# Patient Record
Sex: FEMALE | Race: WHITE
[De-identification: names, ages, dates, MRNs, and addresses within clinical notes are randomized per-mention and may not be internally consistent; named-entity substitution may affect disease eponyms.]

---

## 2021-02-08 ENCOUNTER — HOSPITAL ENCOUNTER (EMERGENCY)
Dept: HOSPITAL 33 - ED | Age: 26
Discharge: HOME | End: 2021-02-08
Payer: COMMERCIAL

## 2021-02-08 VITALS — OXYGEN SATURATION: 100 % | HEART RATE: 81 BPM | SYSTOLIC BLOOD PRESSURE: 113 MMHG | DIASTOLIC BLOOD PRESSURE: 66 MMHG

## 2021-02-08 DIAGNOSIS — O21.0: ICD-10-CM

## 2021-02-08 DIAGNOSIS — Z3A.15: ICD-10-CM

## 2021-02-08 DIAGNOSIS — R10.13: ICD-10-CM

## 2021-02-08 DIAGNOSIS — O26.892: Primary | ICD-10-CM

## 2021-02-08 DIAGNOSIS — R10.11: ICD-10-CM

## 2021-02-08 LAB
ALBUMIN SERPL-MCNC: 3.9 G/DL (ref 3.5–5)
ALP SERPL-CCNC: 41 U/L (ref 38–126)
ALT SERPL-CCNC: 12 U/L (ref 0–35)
ANION GAP SERPL CALC-SCNC: 11 MEQ/L (ref 5–15)
AST SERPL QL: 21 U/L (ref 14–36)
BASOPHILS # BLD AUTO: 0 10*3/UL (ref 0–0.4)
BASOPHILS NFR BLD AUTO: 0 % (ref 0–0.4)
BILIRUB BLD-MCNC: 0.2 MG/DL (ref 0.2–1.3)
BUN SERPL-MCNC: 4 MG/DL (ref 7–17)
CALCIUM SPEC-MCNC: 9.4 MG/DL (ref 8.4–10.2)
CHLORIDE SERPL-SCNC: 105 MMOL/L (ref 98–107)
CO2 SERPL-SCNC: 23 MMOL/L (ref 22–30)
CREAT SERPL-MCNC: 0.59 MG/DL (ref 0.52–1.04)
EOSINOPHIL # BLD AUTO: 0.11 10*3/UL (ref 0–0.5)
GFR SERPLBLD BASED ON 1.73 SQ M-ARVRAT: > 60 ML/MIN
GLUCOSE SERPL-MCNC: 97 MG/DL (ref 74–106)
GLUCOSE UR-MCNC: NEGATIVE MG/DL
HCT VFR BLD AUTO: 38.1 % (ref 35–47)
HGB BLD-MCNC: 12.4 GM/DL (ref 12–16)
LIPASE SERPL-CCNC: 68 U/L (ref 23–300)
LYMPHOCYTES # SPEC AUTO: 1.81 10*3/UL (ref 1–4.6)
MCH RBC QN AUTO: 31.2 PG (ref 26–32)
MCHC RBC AUTO-ENTMCNC: 32.5 G/DL (ref 32–36)
MONOCYTES # BLD AUTO: 0.99 10*3/UL (ref 0–1.3)
PLATELET # BLD AUTO: 275 K/MM3 (ref 150–450)
POTASSIUM SERPLBLD-SCNC: 3.8 MMOL/L (ref 3.5–5.1)
PROT SERPL-MCNC: 7 G/DL (ref 6.3–8.2)
PROT UR STRIP-MCNC: NEGATIVE MG/DL
RBC # BLD AUTO: 3.98 M/MM3 (ref 4.1–5.4)
RBC #/AREA URNS HPF: (no result) /HPF (ref 0–2)
SODIUM SERPL-SCNC: 135 MMOL/L (ref 137–145)
WBC # BLD AUTO: 9.5 K/MM3 (ref 4–10.5)
WBC #/AREA URNS HPF: (no result) /HPF (ref 0–5)

## 2021-02-08 PROCEDURE — 81001 URINALYSIS AUTO W/SCOPE: CPT

## 2021-02-08 PROCEDURE — 96374 THER/PROPH/DIAG INJ IV PUSH: CPT

## 2021-02-08 PROCEDURE — 80048 BASIC METABOLIC PNL TOTAL CA: CPT

## 2021-02-08 PROCEDURE — 36415 COLL VENOUS BLD VENIPUNCTURE: CPT

## 2021-02-08 PROCEDURE — 36000 PLACE NEEDLE IN VEIN: CPT

## 2021-02-08 PROCEDURE — 83690 ASSAY OF LIPASE: CPT

## 2021-02-08 PROCEDURE — 80076 HEPATIC FUNCTION PANEL: CPT

## 2021-02-08 PROCEDURE — 85025 COMPLETE CBC W/AUTO DIFF WBC: CPT

## 2021-02-08 PROCEDURE — 99284 EMERGENCY DEPT VISIT MOD MDM: CPT

## 2021-02-08 NOTE — ERPHSYRPT
<JENIFER TORREZ - Last Filed: 21 19:10>





- History of Present Illness


Source: patient


Exam Limitations: no limitations


Associated Symptoms: nausea, vomiting, abdominal pain


Hx Tetanus, Diphtheria Vaccination/Date Given: Yes ()


Hx Influenza Vaccination/Date Given: No


Hx Pneumococcal Vaccination/Date Given: No





<LUPILLO FUNK - Last Filed: 21 10:04>





- History of Present Illness


Time Seen by Provider: 21 18:03


Physician History: 





The patient is a 25-year-old  who is currently 15 weeks estimated 

gestational age who presents with a chief complaint of abdominal pain.


Onset reported this as Saturday.


The pain is described as a sharp pain located in her epigastrium and right upper

quadrant that waxes and wanes in severity and has been intermittent.


The pain is been constant and is associated with nausea in addition to vomiting.


She denies any fever or chills as well as diarrhea.


She denies vaginal bleeding or discharge and reportedly spoke to her OB today 

who instructed her to come to the emergency department for further evaluation.


The patient denies history of gallstones.


She has not taken any pain and does not want to take anything because she is 

pregnant. (LUPILLO FUNK)


Allergies/Adverse Reactions: 








levofloxacin [From Levaquin] Allergy (Verified 18 19:42)


   


melatonin Allergy (Verified 21 18:03)


   


Penicillins Allergy (Verified 18 19:42)


   


sulfamethoxazole [From Bactrim] Allergy (Verified 18 19:42)


   


trimethoprim [From Bactrim] Allergy (Verified 18 19:42)


   





Home Medications: 








Metformin HCl 500 mg*** [Glucophage 500 MG***] 1 ea BID 21 [History]


Prenatal Vits W-Ca,Fe,FA(<1Mg) [Prenatal] 1 ea DAILY 21 [History]








- Review of Systems


Constitutional: No Fever, No Chills


Abdominal/Gastrointestinal: Abdominal Pain, Nausea, Vomiting


Genitourinary Symptoms: Pregnancy, No Vaginal Bleeding, No Vaginal Discharge


Musculoskeletal: No Symptoms


Skin: No Symptoms


Neurological: No Symptoms





<LUPILLO FUNK - Last Filed: 21 10:04>





- Past Medical History


Pertinent Past Medical History: No


Neurological History: No Pertinent History


ENT History: No Pertinent History


Cardiac History: No Pertinent History


Respiratory History: No Pertinent History


Endocrine Medical History: No Pertinent History


Musculoskeletal History: No Pertinent History


GI Medical History: No Pertinent History


 History: No Pertinent History


Psycho-Social History: No Pertinent History


Female Reproductive Disorders: No Pertinent History





- Past Surgical History


Past Surgical History: Yes


Other Surgical History: sinus surgery in 





- Social History


Smoking Status: Never smoker


Exposure to second hand smoke: No


Drug Use: none


Patient Lives Alone: No





<LUPILLO FUNK - Last Filed: 21 10:04>





- Physical Exam


General Appearance: no apparent distress, alert


Eye Exam: PERRL/EOMI, No scleral icterus, No EOM palsy/anisocoria


Ears, Nose, Throat Exam: pharynx normal, moist mucous membranes, No pharyngeal 

erythema, No tonsillar exudate


Neck Exam: normal inspection, non-tender, supple


Respiratory Exam: normal breath sounds, lungs clear, No chest tenderness, No 

respiratory distress


Cardiovascular Exam: regular rate/rhythm, normal heart sounds, normal peripheral

 pulses, No murmur, No friction rub, No gallop, No tachycardia, No edema


Gastrointestinal/Abdomen Exam: soft, tenderness (RUQ tenderness and epigastric 

tenderness), other (Gravid abdomen), No guarding, No rebound, No hepatomegaly


Pelvic Exam: not done


Rectal Exam: No deferred


Back Exam: normal inspection


Extremity Exam: normal inspection


Neurologic Exam: alert, oriented x 3, cooperative


Skin Exam: normal color, warm, dry, rash, No petechiae, No jaundice


**SpO2 Interpretation**: normal


O2 Delivery: Room Air





<LUPILLO FUNK - Last Filed: 21 10:04>





- Nursing Vital Signs


Nursing Vital Signs: 


                               Initial Vital Signs











Temperature  97.1 F   21 17:53


 


Pulse Rate  103 H  21 17:53


 


Respiratory Rate  18   21 17:53


 


Blood Pressure  137/63   21 17:53


 


O2 Sat by Pulse Oximetry  100   21 17:53








                                   Pain Scale











Pain Intensity                 7

















- Course


Nursing assessment & vital signs reviewed: Yes





<LUPILLO FUNK - Last Filed: 21 10:04>


Ordered Tests: 


                               Active Orders 24 hr











 Category Date Time Status


 


 IV Insertion STAT Care  02/08/21 18:08 Completed


 


 BMP Stat Lab  21 18:25 Completed


 


 CBC W DIFF Stat Lab  21 18:25 Completed


 


 Hepatic Function Panel Stat Lab  21 18:25 Completed


 


 LIPASE Stat Lab  21 18:25 Completed


 


 UA W/RFX UR CULTURE Stat Lab  21 18:33 Completed








Medication Summary














Discontinued Medications














Generic Name Dose Route Start Last Admin





  Trade Name Terri  PRN Reason Stop Dose Admin


 


Ondansetron HCl  4 mg  21 18:09  21 18:36





  Zofran 4 Mg/2 Ml Vial**  IV  21 18:10  4 mg





  STAT ONE   Administration


 


Ondansetron HCl  Confirm  21 18:34 





  Zofran 4 Mg/2 Ml Vial**  Administered  21 18:35 





  Dose  





  4 mg  





  .ROUTE  





  .STK-MED ONE  











Lab/Rad Data: 


                           Laboratory Result Diagrams





                                 21 18:25 





                                 21 18:25 





                               Laboratory Results











  21 Range/Units





  18:33 18:25 18:25 


 


WBC    9.5  (4.0-10.5)  K/mm3


 


RBC    3.98 L  (4.1-5.4)  M/mm3


 


Hgb    12.4  (12.0-16.0)  gm/dl


 


Hct    38.1  (35-47)  %


 


MCV    95.7  ()  fl


 


MCH    31.2  (26-32)  pg


 


MCHC    32.5  (32-36)  g/dl


 


RDW    12.6  (11.5-14.0)  %


 


Plt Count    275  (150-450)  K/mm3


 


MPV    8.2  (7.5-11.0)  fl


 


Gran %    69.3 H  (36.0-66.0)  %


 


Eos # (Auto)    0.11  (0-0.5)  


 


Absolute Lymphs (auto)    1.81  (1.0-4.6)  


 


Absolute Monos (auto)    0.99  (0.0-1.3)  


 


Lymphocytes %    19.1 L  (24.0-44.0)  %


 


Monocytes %    10.4  (0.0-12.0)  %


 


Eosinophils %    1.2  (0.00-5.0)  %


 


Basophils %    0.0  (0.0-0.4)  %


 


Absolute Granulocytes    6.58  (1.4-6.9)  


 


Basophils #    0  (0-0.4)  


 


Sodium   135 L   (137-145)  mmol/L


 


Potassium   3.8   (3.5-5.1)  mmol/L


 


Chloride   105   ()  mmol/L


 


Carbon Dioxide   23   (22-30)  mmol/L


 


Anion Gap   11.0   (5-15)  MEQ/L


 


BUN   4 L   (7-17)  mg/dL


 


Creatinine   0.59   (0.52-1.04)  mg/dL


 


Estimated GFR   > 60.0   ML/MIN


 


Glucose   97   ()  mg/dL


 


Calcium   9.4   (8.4-10.2)  mg/dL


 


Total Bilirubin   0.20   (0.2-1.3)  mg/dL


 


Direct Bilirubin   0.1   (0.0-0.4)  mg/dL


 


AST   21   (14-36)  U/L


 


ALT   12   (0-35)  U/L


 


Alkaline Phosphatase   41   ()  U/L


 


Serum Total Protein   7.0   (6.3-8.2)  g/dL


 


Albumin   3.9   (3.5-5.0)  g/dL


 


Lipase   68   ()  U/L


 


Urine Color  YELLOW    (YELLOW)  


 


Urine Appearance  CLOUDY    (CLEAR)  


 


Urine pH  5.0    (5-6)  


 


Ur Specific Gravity  1.019    (1.005-1.025)  


 


Urine Protein  NEGATIVE    (Negative)  


 


Urine Ketones  NEGATIVE    (NEGATIVE)  


 


Urine Blood  NEGATIVE    (0-5)  Roque/ul


 


Urine Nitrite  NEGATIVE    (NEGATIVE)  


 


Urine Bilirubin  NEGATIVE    (NEGATIVE)  


 


Urine Urobilinogen  NEGATIVE    (0-1)  mg/dL


 


Ur Leukocyte Esterase  NEGATIVE    (NEGATIVE)  


 


Urine WBC (Auto)  0-2    (0-5)  /HPF


 


Urine RBC (Auto)  3-5    (0-2)  /HPF


 


U Epithel Cells (Auto)  FEW    (FEW)  /HPF


 


Urine Bacteria (Auto)  NONE    (NEGATIVE)  /HPF


 


Urine Mucus (Auto)  SLIGHT    (NEGATIVE)  /HPF


 


Urine Culture Reflexed  NO    (NO)  


 


Urine Glucose  NEGATIVE    (NEGATIVE)  mg/dL














- Progress


Progress: improved, re-examined


Discussed with : Bushra


Counseled pt/family regarding: lab results, diagnosis, need for follow-up





<JENIFER TORREZ - Last Filed: 21 19:10>





<LUPILLO FUNK - Last Filed: 21 10:04>





- Progress


Progress Note: 





21 19:11


I spoke with Dr. Mi, the patient's obstetrician.  I reviewed the patient's 

condition, physical findings, laboratory work-up results and response of the 

patient to treatment.  He feels the patient can be discharged to home.  She will

 follow up with his office to make arrangements for outpatient gallbladder 

ultrasound if he feels it is indicated at that time.  We will send the patient 

home with Pepcid prescription as well as a prescription for Zofran.  He agrees 

with this medication regimen. (JENIFER TORREZ)





21 18:39


Nontoxic appearance.  The patient presents with epigastric pain and right upper 

quadrant pain in the context of being 15 weeks estimated gestational age.  

Overall, she is well-appearing.  Differential at this time includes gastritis, 

peptic ulcer disease, cholelithiasis, cholecystitis, choledocholithiasis.  I 

have a low gestalt for PE at this time and the patient has no vaginal bleeding 

or discharge or symptoms consistent with a UTI and my suspicion for TOA or fetal

 demise at this time is low.  Labs to include CBC, BMP, LFTs, lipase and UA are 

currently pending.  I suspect this patient will likely warrant a right upper 

quadrant ultrasound for further evaluation.  Patient care will be transitioned 

to Dr. Torrez pending workup findings


21 18:54


Nursing reports the patients FHT are 160 bpm


21 19:06


Was reassessed to find that her pain had nearly resolved that her nausea was 

better.  Dr. Torrez is currently speaking with her OB/GYN right now to figure 

out how to navigate the patient in terms of her.  Her laboratory work-up is 

relatively benign and if need be her right upper quadrant ultrasound could be 

obtained as an outpatient. (LUPILLO FUNK)





- Departure


Departure Disposition: Home


Critical Care Time: No





<JENIFER TORREZ - Last Filed: 21 19:10>





<LUPILLO FUNK - Last Filed: 21 10:04>





- Departure


Clinical Impression: 


 Epigastric pain, Right upper quadrant abdominal pain





Condition: Stable


Referrals: 


JOSE CELESTE NP [Primary Care Provider] - 


Instructions:  Acute Abdomen (Belly Pain), Adult (DC)


Additional Instructions: 


Avoid fatty greasy spicy foods.  Drink plenty of fluids.  Follow-up with Dr. Mi's office tomorrow to make arrangements for a follow-up appointment.  Take 

the medication as prescribed.


Prescriptions: 


Ondansetron ODT 4 MG*** [Zofran Odt 4 mg***] 4 mg PO Q6H PRN PRN #10 tab.rapdis


 PRN Reason: Vomiting


Famotidine 20 mg*** [Pepcid 20 MG***] 20 mg PO DAILY #10 tablet

## 2021-02-15 ENCOUNTER — HOSPITAL ENCOUNTER (EMERGENCY)
Dept: HOSPITAL 33 - ED | Age: 26
Discharge: HOME | End: 2021-02-15
Payer: COMMERCIAL

## 2021-02-15 VITALS — OXYGEN SATURATION: 99 %

## 2021-02-15 VITALS — HEART RATE: 90 BPM | SYSTOLIC BLOOD PRESSURE: 114 MMHG | DIASTOLIC BLOOD PRESSURE: 90 MMHG

## 2021-02-15 DIAGNOSIS — R10.9: ICD-10-CM

## 2021-02-15 DIAGNOSIS — R42: ICD-10-CM

## 2021-02-15 DIAGNOSIS — Z79.899: ICD-10-CM

## 2021-02-15 DIAGNOSIS — O21.0: Primary | ICD-10-CM

## 2021-02-15 DIAGNOSIS — N39.0: ICD-10-CM

## 2021-02-15 DIAGNOSIS — Z3A.16: ICD-10-CM

## 2021-02-15 LAB
GLUCOSE UR-MCNC: NEGATIVE MG/DL
PROT UR STRIP-MCNC: 30 MG/DL
RBC #/AREA URNS HPF: (no result) /HPF (ref 0–2)
WBC #/AREA URNS HPF: (no result) /HPF (ref 0–5)

## 2021-02-15 PROCEDURE — 96374 THER/PROPH/DIAG INJ IV PUSH: CPT

## 2021-02-15 PROCEDURE — 96375 TX/PRO/DX INJ NEW DRUG ADDON: CPT

## 2021-02-15 PROCEDURE — 76805 OB US >/= 14 WKS SNGL FETUS: CPT

## 2021-02-15 PROCEDURE — 96365 THER/PROPH/DIAG IV INF INIT: CPT

## 2021-02-15 PROCEDURE — 96361 HYDRATE IV INFUSION ADD-ON: CPT

## 2021-02-15 PROCEDURE — 99285 EMERGENCY DEPT VISIT HI MDM: CPT

## 2021-02-15 PROCEDURE — 87086 URINE CULTURE/COLONY COUNT: CPT

## 2021-02-15 PROCEDURE — 36000 PLACE NEEDLE IN VEIN: CPT

## 2021-02-15 PROCEDURE — 81001 URINALYSIS AUTO W/SCOPE: CPT

## 2021-02-15 PROCEDURE — 96360 HYDRATION IV INFUSION INIT: CPT

## 2021-02-15 NOTE — ERPHSYRPT
- History of Present Illness


Historian: patient


Exam Limitations: no limitations


Patient Subjective Stated Complaint: vomiting


Triage Nursing Assessment: Patient ambulated back to ED and transferred self to 

bed. Patient A+O X3. Kelly'ts skin pink, warm and dry. Patient states she is 16

weeks pregnant and has been having N/V constanly. Patient states she was at work

today and got dizzy and felt like she was going to "Pass out".  Patient 

complains of lower abdominal cramping.  Abdomen soft and round with BS X 4.


Physician History: 





24 yo wf  16wks preg presents w N/V/cramping x10 days. Pt has been to ER for

same complaint on 21 for same complaint. She denies vag bleeding or DC. 

Hematuria/dysuria/fever/melena/hematochezia/diarrhea are all denied. Pt states 

that she has been a little lightheaded. 


Timing/Duration: other (10 days)


Modifying Factors: Improves With: nothing


Associated Symptoms: nausea, vomiting, No back, No chest pain, No diaphoresis, 

No diarrhea, No fever/chills, No fatigue, No headache, No heartburn, No loss of 

appetite, No neck pain, No rash, No shortness of breath, No syncope, No weakness


Previous symptoms: same symptoms as today


Allergies/Adverse Reactions: 








levofloxacin [From Levaquin] Allergy (Verified 02/15/21 14:34)


   


melatonin Allergy (Verified 02/15/21 14:34)


   


Penicillins Allergy (Verified 02/15/21 14:34)


   


sulfamethoxazole [From Bactrim] Allergy (Verified 02/15/21 14:34)


   


trimethoprim [From Bactrim] Allergy (Verified 02/15/21 14:34)


   





Home Medications: 








Metformin HCl 500 mg*** [Glucophage 500 MG***] 1 ea PO BID 21 [History]


Prenatal Vits W-Ca,Fe,FA(<1Mg) [Prenatal] 1 ea PO DAILY 21 [History]





Hx Tetanus, Diphtheria Vaccination/Date Given: Yes ()


Hx Influenza Vaccination/Date Given: Yes


Hx Pneumococcal Vaccination/Date Given: No


Immunizations Up to Date: Yes





Travel Risk





- International Travel


Have you traveled outside of the country in past 3 weeks: No





- Coronavirus Screening


Are you exhibiting any of the following symptoms?: No


Close contact with a COVID-19 positive Pt in past 14-21 Days: No





- Review of Systems


Constitutional: No Symptoms


Eyes: No Symptoms


Ears, Nose, & Throat: No Symptoms


Respiratory: No Symptoms


Cardiac: No Symptoms


Abdominal/Gastrointestinal: No Symptoms, Abdominal Pain, Nausea, Vomiting, No 

Diarrhea


Genitourinary Symptoms: No Symptoms


Musculoskeletal: No Symptoms


Skin: No Symptoms


Neurological: No Symptoms


Psychological: No Symptoms


Endocrine: No Symptoms


Hematologic/Lymphatic: No Symptoms


Immunological/Allergic: No Symptoms





- Past Medical History


Pertinent Past Medical History: No


Neurological History: No Pertinent History


ENT History: No Pertinent History


Cardiac History: No Pertinent History


Respiratory History: No Pertinent History


Endocrine Medical History: No Pertinent History


Musculoskeletal History: No Pertinent History


GI Medical History: No Pertinent History


 History: No Pertinent History


Psycho-Social History: No Pertinent History


Female Reproductive Disorders: No Pertinent History





- Past Surgical History


Past Surgical History: Yes


Musculoskeletal: Orthopedic Surgery


Other Surgical History: sinus surgery in 





- Social History


Smoking Status: Never smoker


Exposure to second hand smoke: No


Drug Use: none


Patient Lives Alone: No


Significant Family History: no pertinent family hx





- Female History


Hx Last Menstrual Period: 


Hx Pregnant Now: Yes


Expected Date of Delivery: 21





- Nursing Vital Signs


Nursing Vital Signs: 


                               Initial Vital Signs











Temperature  97.9 F   02/15/21 14:36


 


Pulse Rate  98 H  02/15/21 14:36


 


Respiratory Rate  18   02/15/21 14:36


 


Blood Pressure  130/67   02/15/21 14:36


 


O2 Sat by Pulse Oximetry  100   02/15/21 14:36








                                   Pain Scale











Pain Intensity                 0

















- Physical Exam


General Appearance: no apparent distress


Eye Exam: PERRL/EOMI


Ears, Nose, Throat Exam: normal ENT inspection, TMs normal, pharynx normal, 

moist mucous membranes


Neck Exam: normal inspection, non-tender, supple, full range of motion, No 

meningismus, No mass, No Brudzinski, No Kernig's


Respiratory Exam: normal breath sounds, lungs clear, airway intact


Cardiovascular Exam: regular rate/rhythm, normal heart sounds, normal peripheral

 pulses, No murmur


Gastrointestinal/Abdomen Exam: soft, normal bowel sounds, No tenderness


Pelvic Exam: not done


Back Exam: normal inspection


Extremity Exam: normal inspection


Neurologic Exam: alert, oriented x 3, cooperative, CNs II-XII nml as tested, 

normal mood/affect, nml cerebellar function, nml station & gait, sensation nml, 

No motor deficits, No sensory deficit


Skin Exam: normal color


Lymphatic Exam: No adenopathy


**SpO2 Interpretation**: normal


SpO2: 99


O2 Delivery: Room Air





- Course


Nursing assessment & vital signs reviewed: Yes





- Radiology Ultrasound Exam


  ** Abdomen


Ultrasound: discussed w/radiologist (I[facility]//Anterior placenta)


Ordered Tests: 


                               Active Orders 24 hr











 Category Date Time Status


 


 OB >14 WKS 1st GESTATION [US] Stat Exams  02/15/21 15:42 Completed


 


 CULTURE,URINE Stat Lab  02/15/21 14:43 Received


 


 UA W/RFX UR CULTURE Stat Lab  02/15/21 14:43 Completed








Medication Summary














Discontinued Medications














Generic Name Dose Route Start Last Admin





  Trade Name Freq  PRN Reason Stop Dose Admin


 


Diphenhydramine HCl  25 mg  02/15/21 15:44  02/15/21 15:48





  Benadryl 50 Mg/Ml***  IV  02/15/21 15:45  25 mg





  STAT ONE   Administration


 


Diphenhydramine HCl  Confirm  02/15/21 15:47 





  Benadryl 50 Mg/Ml***  Administered  02/15/21 15:48 





  Dose  





  50 mg  





  .ROUTE  





  .STK-MED ONE  


 


Sodium Chloride  1,000 mls @ 999 mls/hr  02/15/21 14:40  02/15/21 15:55





  Sodium Chloride 0.9% 1000 Ml  IV  02/15/21 15:40  Infused





  .Q1H1M STA   Infusion


 


Sodium Chloride  Confirm  02/15/21 14:44 





  Sodium Chloride 0.9% 1000 Ml  Administered  02/15/21 14:45 





  Dose  





  1,000 mls @ ud  





  .ROUTE  





  .STK-MED ONE  


 


Ceftriaxone Sodium/Dextrose  1 g in 50 mls @ 100 mls/hr  02/15/21 15:43  

02/15/21 16:45





  Rocephin 1 Gm-D5w 50 Ml Bag**  IV  02/15/21 16:12  Infused





  STAT STA   Infusion


 


Ceftriaxone Sodium/Dextrose  Confirm  02/15/21 15:47 





  Rocephin 1 Gm-D5w 50 Ml Bag**  Administered  02/15/21 15:48 





  Dose  





  1 g in 50 mls @ ud  





  IV  





  .STK-MED ONE  


 


Sodium Chloride  1,000 mls @ 999 mls/hr  02/15/21 15:49  02/15/21 16:56





  Sodium Chloride 0.9% 1000 Ml  IV  02/15/21 16:49  Infused





  .Q1H1M STA   Infusion


 


Sodium Chloride  Confirm  02/15/21 15:49 





  Sodium Chloride 0.9% 1000 Ml  Administered  02/15/21 15:50 





  Dose  





  1,000 mls @ ud  





  .ROUTE  





  .STK-MED ONE  


 


Ondansetron HCl  4 mg  02/15/21 14:40  02/15/21 14:45





  Zofran 4 Mg/2 Ml Vial**  IV  02/15/21 14:41  4 mg





  STAT ONE   Administration


 


Ondansetron HCl  Confirm  02/15/21 14:44 





  Zofran 4 Mg/2 Ml Vial**  Administered  02/15/21 14:45 





  Dose  





  4 mg  





  .ROUTE  





  .STK-MED ONE  











Lab/Rad Data: 


                               Laboratory Results











  02/15/21 Range/Units





  14:43 


 


Urine Color  YELLOW  (YELLOW)  


 


Urine Appearance  CLOUDY  (CLEAR)  


 


Urine pH  6.0  (5-6)  


 


Ur Specific Gravity  1.017  (1.005-1.025)  


 


Urine Protein  30  (Negative)  


 


Urine Ketones  TRACE  (NEGATIVE)  


 


Urine Blood  NEGATIVE  (0-5)  Roque/ul


 


Urine Nitrite  NEGATIVE  (NEGATIVE)  


 


Urine Bilirubin  NEGATIVE  (NEGATIVE)  


 


Urine Urobilinogen  NEGATIVE  (0-1)  mg/dL


 


Ur Leukocyte Esterase  NEGATIVE  (NEGATIVE)  


 


Urine WBC (Auto)  3-5  (0-5)  /HPF


 


Urine RBC (Auto)  NONE  (0-2)  /HPF


 


U Epithel Cells (Auto)  MANY  (FEW)  /HPF


 


Urine Bacteria (Auto)  MODERATE  (NEGATIVE)  /HPF


 


Urine Mucus (Auto)  SLIGHT  (NEGATIVE)  /HPF


 


Urine Culture Reflexed  YES  (NO)  


 


Urine Glucose  NEGATIVE  (NEGATIVE)  mg/dL














- Progress


Progress: improved


Progress Note: 





02/15/21 16:03


Dr. Mi consulted on pt in ER and wants hydration and 1gm IV Rocephin for mild

 UTI.


02/15/21 16:14


1L NS bolus x2/4mg IV Zofran w improvement


US results reviewed w pt


Discussed with : Other (Dr. Mi)


Counseled pt/family regarding: lab results, diagnosis, need for follow-up, rad 

results





- Departure


Departure Disposition: Home


Clinical Impression: 


 Hyperemesis gravidarum, UTI (urinary tract infection) during pregnancy





Condition: Stable


Critical Care Time: No


Referrals: 


JOSE CELESTE NP [Primary Care Provider] - 


Instructions:  Nausea and Vomiting of Pregnancy (DC), Urinary Tract Infections 

in Pregnancy


Additional Instructions: 


Follow up with Dr. Mi


Return to ER as needed


Continue Maggiefran per Dr. Mi

## 2021-06-09 ENCOUNTER — HOSPITAL ENCOUNTER (OUTPATIENT)
Dept: HOSPITAL 33 - WHC | Age: 26
Setting detail: OBSERVATION
Discharge: HOME | End: 2021-06-09
Attending: OBSTETRICS & GYNECOLOGY | Admitting: OBSTETRICS & GYNECOLOGY
Payer: COMMERCIAL

## 2021-06-09 VITALS — HEART RATE: 96 BPM | DIASTOLIC BLOOD PRESSURE: 73 MMHG | SYSTOLIC BLOOD PRESSURE: 122 MMHG | OXYGEN SATURATION: 98 %

## 2021-06-09 DIAGNOSIS — O36.5930: Primary | ICD-10-CM

## 2021-06-09 DIAGNOSIS — Z3A.32: ICD-10-CM

## 2021-06-09 PROCEDURE — 59025 FETAL NON-STRESS TEST: CPT

## 2021-06-09 PROCEDURE — G0378 HOSPITAL OBSERVATION PER HR: HCPCS

## 2021-06-13 ENCOUNTER — HOSPITAL ENCOUNTER (OUTPATIENT)
Dept: HOSPITAL 33 - OB | Age: 26
Setting detail: OBSERVATION
Discharge: HOME | End: 2021-06-13
Attending: OBSTETRICS & GYNECOLOGY | Admitting: OBSTETRICS & GYNECOLOGY
Payer: COMMERCIAL

## 2021-06-13 VITALS — HEART RATE: 95 BPM | SYSTOLIC BLOOD PRESSURE: 112 MMHG | DIASTOLIC BLOOD PRESSURE: 65 MMHG

## 2021-06-13 DIAGNOSIS — Z34.03: Primary | ICD-10-CM

## 2021-06-13 DIAGNOSIS — Z3A.32: ICD-10-CM

## 2021-06-13 PROCEDURE — G0378 HOSPITAL OBSERVATION PER HR: HCPCS

## 2021-06-13 PROCEDURE — 59025 FETAL NON-STRESS TEST: CPT

## 2021-06-16 ENCOUNTER — HOSPITAL ENCOUNTER (OUTPATIENT)
Dept: HOSPITAL 33 - MED SURG | Age: 26
Setting detail: OBSERVATION
Discharge: HOME | End: 2021-06-16
Attending: OBSTETRICS & GYNECOLOGY | Admitting: OBSTETRICS & GYNECOLOGY
Payer: COMMERCIAL

## 2021-06-16 DIAGNOSIS — Z3A.33: ICD-10-CM

## 2021-06-16 DIAGNOSIS — Z34.03: Primary | ICD-10-CM

## 2021-06-16 PROCEDURE — 59025 FETAL NON-STRESS TEST: CPT

## 2021-06-16 PROCEDURE — G0378 HOSPITAL OBSERVATION PER HR: HCPCS

## 2021-06-20 ENCOUNTER — HOSPITAL ENCOUNTER (OUTPATIENT)
Dept: HOSPITAL 33 - MED SURG | Age: 26
Setting detail: OBSERVATION
Discharge: HOME | End: 2021-06-20
Attending: FAMILY MEDICINE | Admitting: FAMILY MEDICINE
Payer: COMMERCIAL

## 2021-06-20 VITALS — SYSTOLIC BLOOD PRESSURE: 114 MMHG | DIASTOLIC BLOOD PRESSURE: 63 MMHG | OXYGEN SATURATION: 100 % | HEART RATE: 106 BPM

## 2021-06-20 DIAGNOSIS — Z3A.34: ICD-10-CM

## 2021-06-20 DIAGNOSIS — Z34.03: Primary | ICD-10-CM

## 2021-06-20 PROCEDURE — G0378 HOSPITAL OBSERVATION PER HR: HCPCS

## 2021-06-20 PROCEDURE — 59025 FETAL NON-STRESS TEST: CPT

## 2021-06-23 ENCOUNTER — HOSPITAL ENCOUNTER (OUTPATIENT)
Dept: HOSPITAL 33 - MED SURG | Age: 26
Setting detail: OBSERVATION
Discharge: HOME | End: 2021-06-23
Attending: OBSTETRICS & GYNECOLOGY | Admitting: OBSTETRICS & GYNECOLOGY
Payer: COMMERCIAL

## 2021-06-23 VITALS — HEART RATE: 101 BPM | DIASTOLIC BLOOD PRESSURE: 71 MMHG | SYSTOLIC BLOOD PRESSURE: 124 MMHG

## 2021-06-23 DIAGNOSIS — Z34.03: Primary | ICD-10-CM

## 2021-06-23 DIAGNOSIS — Z3A.34: ICD-10-CM

## 2021-06-23 PROCEDURE — 59025 FETAL NON-STRESS TEST: CPT

## 2021-06-23 PROCEDURE — G0378 HOSPITAL OBSERVATION PER HR: HCPCS

## 2021-06-27 ENCOUNTER — HOSPITAL ENCOUNTER (OUTPATIENT)
Dept: HOSPITAL 33 - OB | Age: 26
Setting detail: OBSERVATION
Discharge: HOME | End: 2021-06-27
Attending: OBSTETRICS & GYNECOLOGY | Admitting: OBSTETRICS & GYNECOLOGY
Payer: COMMERCIAL

## 2021-06-27 DIAGNOSIS — Z34.03: Primary | ICD-10-CM

## 2021-06-27 DIAGNOSIS — Z3A.34: ICD-10-CM

## 2021-06-27 PROCEDURE — G0378 HOSPITAL OBSERVATION PER HR: HCPCS

## 2021-06-27 PROCEDURE — 59025 FETAL NON-STRESS TEST: CPT

## 2021-06-30 ENCOUNTER — HOSPITAL ENCOUNTER (OUTPATIENT)
Dept: HOSPITAL 33 - MED SURG | Age: 26
Setting detail: OBSERVATION
Discharge: HOME | End: 2021-06-30
Attending: OBSTETRICS & GYNECOLOGY | Admitting: OBSTETRICS & GYNECOLOGY
Payer: COMMERCIAL

## 2021-06-30 VITALS — SYSTOLIC BLOOD PRESSURE: 109 MMHG | DIASTOLIC BLOOD PRESSURE: 67 MMHG | OXYGEN SATURATION: 99 % | HEART RATE: 83 BPM

## 2021-06-30 DIAGNOSIS — Z34.03: Primary | ICD-10-CM

## 2021-06-30 DIAGNOSIS — Z3A.34: ICD-10-CM

## 2021-06-30 LAB
AMPHETAMINES UR QL: NEGATIVE
BARBITURATES UR QL: NEGATIVE
BENZODIAZ UR QL SCN: NEGATIVE
COCAINE UR QL SCN: NEGATIVE
METHADONE UR QL: NEGATIVE
OPIATES UR QL: NEGATIVE
PCP UR QL CFM>20 NG/ML: NEGATIVE
THC UR QL SCN: NEGATIVE

## 2021-06-30 PROCEDURE — G0378 HOSPITAL OBSERVATION PER HR: HCPCS

## 2021-06-30 PROCEDURE — 80307 DRUG TEST PRSMV CHEM ANLYZR: CPT

## 2021-07-04 ENCOUNTER — HOSPITAL ENCOUNTER (OUTPATIENT)
Dept: HOSPITAL 33 - OB | Age: 26
Setting detail: OBSERVATION
Discharge: HOME | End: 2021-07-04
Attending: OBSTETRICS & GYNECOLOGY | Admitting: OBSTETRICS & GYNECOLOGY
Payer: COMMERCIAL

## 2021-07-04 VITALS — SYSTOLIC BLOOD PRESSURE: 108 MMHG | DIASTOLIC BLOOD PRESSURE: 61 MMHG | HEART RATE: 75 BPM

## 2021-07-04 DIAGNOSIS — Z34.03: Primary | ICD-10-CM

## 2021-07-04 DIAGNOSIS — Z3A.35: ICD-10-CM

## 2021-07-04 PROCEDURE — 59025 FETAL NON-STRESS TEST: CPT

## 2021-07-04 PROCEDURE — G0378 HOSPITAL OBSERVATION PER HR: HCPCS

## 2021-07-07 ENCOUNTER — HOSPITAL ENCOUNTER (OUTPATIENT)
Dept: HOSPITAL 33 - OB | Age: 26
Setting detail: OBSERVATION
Discharge: HOME | End: 2021-07-07
Attending: OBSTETRICS & GYNECOLOGY | Admitting: OBSTETRICS & GYNECOLOGY
Payer: COMMERCIAL

## 2021-07-07 VITALS — HEART RATE: 85 BPM | SYSTOLIC BLOOD PRESSURE: 114 MMHG | DIASTOLIC BLOOD PRESSURE: 61 MMHG

## 2021-07-07 DIAGNOSIS — Z34.03: Primary | ICD-10-CM

## 2021-07-07 DIAGNOSIS — Z3A.36: ICD-10-CM

## 2021-07-07 PROCEDURE — G0378 HOSPITAL OBSERVATION PER HR: HCPCS

## 2021-07-07 PROCEDURE — 76816 OB US FOLLOW-UP PER FETUS: CPT

## 2021-07-07 PROCEDURE — 59025 FETAL NON-STRESS TEST: CPT

## 2021-07-07 NOTE — XRAY
Indication: Fetal growth.



Two-dimensional OB ultrasound performed.



Comparison: June 9, 2021.



Again there is a single viable intrauterine pregnancy in cephalic

presentation. Fetal heart rate 137 BPM.  Anterior placenta without

abruption/previa.



BPD measures 8.71 cm corresponding to 35 weeks 1 days.

HC measures 31.43 cm corresponding to 35 weeks 2 days.

AC measures 32.29 cm corresponding to 36 weeks 1 days.

FL measures 6.92 cm corresponding to 35 weeks 4 days.

Estimated fetal weight 6 lbs. 2 oz., +/-15 ounce. Approximately 39 percentile.

BERTRAM is 8.5 cm.



Impression: Again single viable intrauterine pregnancy with mean gestational

age 35 week 4 days.  Normal progression of pregnancy.  New/acute findings.

## 2021-07-11 ENCOUNTER — HOSPITAL ENCOUNTER (OUTPATIENT)
Dept: HOSPITAL 33 - OB | Age: 26
Setting detail: OBSERVATION
Discharge: HOME | End: 2021-07-11
Attending: OBSTETRICS & GYNECOLOGY | Admitting: OBSTETRICS & GYNECOLOGY
Payer: COMMERCIAL

## 2021-07-11 VITALS — OXYGEN SATURATION: 99 % | HEART RATE: 84 BPM | DIASTOLIC BLOOD PRESSURE: 62 MMHG | SYSTOLIC BLOOD PRESSURE: 109 MMHG

## 2021-07-11 DIAGNOSIS — Z3A.37: ICD-10-CM

## 2021-07-11 DIAGNOSIS — Z34.03: Primary | ICD-10-CM

## 2021-07-11 PROCEDURE — G0378 HOSPITAL OBSERVATION PER HR: HCPCS

## 2021-07-11 PROCEDURE — 59025 FETAL NON-STRESS TEST: CPT

## 2021-07-14 ENCOUNTER — HOSPITAL ENCOUNTER (OUTPATIENT)
Dept: HOSPITAL 33 - OB | Age: 26
Setting detail: OBSERVATION
Discharge: HOME | End: 2021-07-14
Attending: OBSTETRICS & GYNECOLOGY | Admitting: OBSTETRICS & GYNECOLOGY
Payer: COMMERCIAL

## 2021-07-14 VITALS — SYSTOLIC BLOOD PRESSURE: 129 MMHG | HEART RATE: 96 BPM | DIASTOLIC BLOOD PRESSURE: 73 MMHG

## 2021-07-14 DIAGNOSIS — Z34.03: Primary | ICD-10-CM

## 2021-07-14 DIAGNOSIS — Z3A.37: ICD-10-CM

## 2021-07-14 PROCEDURE — G0378 HOSPITAL OBSERVATION PER HR: HCPCS

## 2021-07-14 PROCEDURE — 59025 FETAL NON-STRESS TEST: CPT

## 2021-07-18 ENCOUNTER — HOSPITAL ENCOUNTER (OUTPATIENT)
Dept: HOSPITAL 33 - OB | Age: 26
Setting detail: OBSERVATION
Discharge: HOME | End: 2021-07-18
Attending: OBSTETRICS & GYNECOLOGY | Admitting: OBSTETRICS & GYNECOLOGY
Payer: COMMERCIAL

## 2021-07-18 VITALS — DIASTOLIC BLOOD PRESSURE: 72 MMHG | SYSTOLIC BLOOD PRESSURE: 122 MMHG | HEART RATE: 106 BPM

## 2021-07-18 DIAGNOSIS — Z3A.37: ICD-10-CM

## 2021-07-18 DIAGNOSIS — Z34.03: Primary | ICD-10-CM

## 2021-07-18 PROCEDURE — 59025 FETAL NON-STRESS TEST: CPT

## 2021-07-18 PROCEDURE — G0378 HOSPITAL OBSERVATION PER HR: HCPCS

## 2021-07-21 ENCOUNTER — HOSPITAL ENCOUNTER (OUTPATIENT)
Dept: HOSPITAL 33 - MED SURG | Age: 26
Setting detail: OBSERVATION
Discharge: HOME | End: 2021-07-21
Attending: OBSTETRICS & GYNECOLOGY | Admitting: OBSTETRICS & GYNECOLOGY
Payer: COMMERCIAL

## 2021-07-21 VITALS — HEART RATE: 117 BPM | DIASTOLIC BLOOD PRESSURE: 73 MMHG | SYSTOLIC BLOOD PRESSURE: 116 MMHG

## 2021-07-21 VITALS — DIASTOLIC BLOOD PRESSURE: 75 MMHG | SYSTOLIC BLOOD PRESSURE: 122 MMHG | HEART RATE: 100 BPM

## 2021-07-21 DIAGNOSIS — O36.8130: Primary | ICD-10-CM

## 2021-07-21 DIAGNOSIS — Z3A.38: ICD-10-CM

## 2021-07-21 PROCEDURE — G0378 HOSPITAL OBSERVATION PER HR: HCPCS

## 2021-07-21 PROCEDURE — 59025 FETAL NON-STRESS TEST: CPT

## 2021-07-22 ENCOUNTER — HOSPITAL ENCOUNTER (INPATIENT)
Dept: HOSPITAL 33 - OB | Age: 26
LOS: 3 days | Discharge: HOME | End: 2021-07-25
Attending: OBSTETRICS & GYNECOLOGY | Admitting: OBSTETRICS & GYNECOLOGY
Payer: COMMERCIAL

## 2021-07-22 ENCOUNTER — HOSPITAL ENCOUNTER (OUTPATIENT)
Dept: HOSPITAL 33 - MED SURG | Age: 26
Setting detail: OBSERVATION
Discharge: HOME | End: 2021-07-22
Attending: OBSTETRICS & GYNECOLOGY | Admitting: OBSTETRICS & GYNECOLOGY
Payer: COMMERCIAL

## 2021-07-22 VITALS — SYSTOLIC BLOOD PRESSURE: 119 MMHG | DIASTOLIC BLOOD PRESSURE: 69 MMHG | HEART RATE: 95 BPM

## 2021-07-22 DIAGNOSIS — Z3A.38: ICD-10-CM

## 2021-07-22 DIAGNOSIS — Z34.03: Primary | ICD-10-CM

## 2021-07-22 DIAGNOSIS — N39.0: ICD-10-CM

## 2021-07-22 LAB
ABO GROUP BLD: (no result)
AMPHETAMINES UR QL: NEGATIVE
BARBITURATES UR QL: NEGATIVE
BASOPHILS # BLD AUTO: 0.02 10*3/UL (ref 0–0.4)
BASOPHILS NFR BLD AUTO: 0.2 % (ref 0–0.4)
BENZODIAZ UR QL SCN: NEGATIVE
COCAINE UR QL SCN: NEGATIVE
EOSINOPHIL # BLD AUTO: 0.08 10*3/UL (ref 0–0.5)
HCT VFR BLD AUTO: 31.1 % (ref 35–47)
HGB BLD-MCNC: 10.2 GM/DL (ref 12–16)
LYMPHOCYTES # SPEC AUTO: 1.65 10*3/UL (ref 1–4.6)
MCH RBC QN AUTO: 31.9 PG (ref 26–32)
MCHC RBC AUTO-ENTMCNC: 32.8 G/DL (ref 32–36)
METHADONE UR QL: NEGATIVE
MONOCYTES # BLD AUTO: 1.13 10*3/UL (ref 0–1.3)
OPIATES UR QL: NEGATIVE
PCP UR QL CFM>20 NG/ML: NEGATIVE
PLATELET # BLD AUTO: 270 K/MM3 (ref 150–450)
RBC # BLD AUTO: 3.2 M/MM3 (ref 4.1–5.4)
RH BLD: POSITIVE
THC UR QL SCN: NEGATIVE
WBC # BLD AUTO: 8.4 K/MM3 (ref 4–10.5)

## 2021-07-22 PROCEDURE — 59400 OBSTETRICAL CARE: CPT

## 2021-07-22 PROCEDURE — 87186 SC STD MICRODIL/AGAR DIL: CPT

## 2021-07-22 PROCEDURE — 36415 COLL VENOUS BLD VENIPUNCTURE: CPT

## 2021-07-22 PROCEDURE — 90471 IMMUNIZATION ADMIN: CPT

## 2021-07-22 PROCEDURE — 87389 HIV-1 AG W/HIV-1&-2 AB AG IA: CPT

## 2021-07-22 PROCEDURE — 85025 COMPLETE CBC W/AUTO DIFF WBC: CPT

## 2021-07-22 PROCEDURE — 81001 URINALYSIS AUTO W/SCOPE: CPT

## 2021-07-22 PROCEDURE — 90715 TDAP VACCINE 7 YRS/> IM: CPT

## 2021-07-22 PROCEDURE — 87086 URINE CULTURE/COLONY COUNT: CPT

## 2021-07-22 PROCEDURE — 87340 HEPATITIS B SURFACE AG IA: CPT

## 2021-07-22 PROCEDURE — 86900 BLOOD TYPING SEROLOGIC ABO: CPT

## 2021-07-22 PROCEDURE — 80307 DRUG TEST PRSMV CHEM ANLYZR: CPT

## 2021-07-22 PROCEDURE — 86901 BLOOD TYPING SEROLOGIC RH(D): CPT

## 2021-07-22 PROCEDURE — 87077 CULTURE AEROBIC IDENTIFY: CPT

## 2021-07-22 PROCEDURE — G0378 HOSPITAL OBSERVATION PER HR: HCPCS

## 2021-07-22 PROCEDURE — 86850 RBC ANTIBODY SCREEN: CPT

## 2021-07-22 PROCEDURE — 59025 FETAL NON-STRESS TEST: CPT

## 2021-07-22 RX ADMIN — FAMOTIDINE SCH MG: 20 TABLET, FILM COATED ORAL at 22:13

## 2021-07-22 RX ADMIN — MISOPROSTOL SCH MCG: 100 TABLET ORAL at 22:58

## 2021-07-22 RX ADMIN — MISOPROSTOL SCH MCG: 100 TABLET ORAL at 20:59

## 2021-07-23 RX ADMIN — MISOPROSTOL SCH MCG: 100 TABLET ORAL at 00:59

## 2021-07-23 RX ADMIN — MISOPROSTOL SCH MCG: 100 TABLET ORAL at 04:00

## 2021-07-23 RX ADMIN — DOCUSATE SODIUM SCH MG: 100 CAPSULE, LIQUID FILLED ORAL at 21:46

## 2021-07-23 RX ADMIN — FAMOTIDINE SCH: 20 TABLET, FILM COATED ORAL at 13:04

## 2021-07-23 RX ADMIN — IBUPROFEN PRN MG: 400 TABLET ORAL at 15:29

## 2021-07-23 RX ADMIN — MISOPROSTOL SCH MCG: 100 TABLET ORAL at 05:59

## 2021-07-23 RX ADMIN — ACETAMINOPHEN PRN MG: 500 TABLET ORAL at 01:05

## 2021-07-23 RX ADMIN — ACETAMINOPHEN PRN MG: 500 TABLET ORAL at 14:19

## 2021-07-23 RX ADMIN — ACETAMINOPHEN PRN MG: 500 TABLET ORAL at 21:48

## 2021-07-23 RX ADMIN — Medication PRN PAD: at 15:30

## 2021-07-23 RX ADMIN — FAMOTIDINE SCH MG: 20 TABLET, FILM COATED ORAL at 21:46

## 2021-07-24 LAB
BASOPHILS # BLD AUTO: 0.02 10*3/UL (ref 0–0.4)
BASOPHILS NFR BLD AUTO: 0.1 % (ref 0–0.4)
BLD SMEAR INTERP: YES
EOSINOPHIL # BLD AUTO: 0.14 10*3/UL (ref 0–0.5)
GLUCOSE UR-MCNC: NEGATIVE MG/DL
HCT VFR BLD AUTO: 28.1 % (ref 35–47)
HGB BLD-MCNC: 9.1 GM/DL (ref 12–16)
LYMPHOCYTES # SPEC AUTO: 2.41 10*3/UL (ref 1–4.6)
MCH RBC QN AUTO: 31.7 PG (ref 26–32)
MCHC RBC AUTO-ENTMCNC: 32.4 G/DL (ref 32–36)
MONOCYTES # BLD AUTO: 1.53 10*3/UL (ref 0–1.3)
PLATELET # BLD AUTO: 250 K/MM3 (ref 150–450)
PROT UR STRIP-MCNC: 30 MG/DL
RBC # BLD AUTO: 2.87 M/MM3 (ref 4.1–5.4)
RBC #/AREA URNS HPF: >101 /HPF (ref 0–2)
WBC # BLD AUTO: 14.1 K/MM3 (ref 4–10.5)
WBC #/AREA URNS HPF: >100 /HPF (ref 0–5)

## 2021-07-24 RX ADMIN — DOCUSATE SODIUM SCH MG: 100 CAPSULE, LIQUID FILLED ORAL at 22:50

## 2021-07-24 RX ADMIN — FAMOTIDINE SCH MG: 20 TABLET, FILM COATED ORAL at 09:49

## 2021-07-24 RX ADMIN — NITROFURANTOIN MONOHYDRATE/MACROCRYSTALLINE SCH MG: 25; 75 CAPSULE ORAL at 22:50

## 2021-07-24 RX ADMIN — Medication SCH: at 20:46

## 2021-07-24 RX ADMIN — ACETAMINOPHEN PRN MG: 500 TABLET ORAL at 02:58

## 2021-07-24 RX ADMIN — FAMOTIDINE SCH MG: 20 TABLET, FILM COATED ORAL at 22:50

## 2021-07-24 RX ADMIN — DOCUSATE SODIUM SCH MG: 100 CAPSULE, LIQUID FILLED ORAL at 09:49

## 2021-07-24 RX ADMIN — IBUPROFEN PRN MG: 400 TABLET ORAL at 17:57

## 2021-07-24 RX ADMIN — Medication SCH: at 20:47

## 2021-07-24 RX ADMIN — IBUPROFEN PRN MG: 400 TABLET ORAL at 09:49

## 2021-07-24 RX ADMIN — Medication SCH MG: at 09:49

## 2021-07-24 NOTE — PCM.NOTE
Date and Time: 21  1041





Subjective Assessment: 





PPD 1 SP 


PT RESTING IN BED AND DOING WELL HOWEVER CO DYSURIA SINCE HER DELIVERY 

YESTERDAY.  PT AMBULATING AND TOLERATING DIET.


VSS AFEBRILE


ABD; SOFT


UTERUS; FIRM


LOCHIA; MILD





HGB; 





A/P SP  PPD 1 WITH DYSURIA


  WILL SEND UA C&S


  ANTICIPATE DISCHARGE TOMORROW





OBJECTIVE DATA


Vital Signs: 


                               Vital Signs - 24 hr











  Temp Pulse Resp BP BP BP Pulse Ox


 


 21 02:00  98.3 F  66  16    108/65  100


 


 21 20:00  98.3 F  81  16    113/67  97


 


 21 14:00  98.2 F  82  20   117/54   98


 


 21 13:00   81  18   111/58  


 


 21 12:45   95 H  18   117/57  


 


 21 12:30   97 H  20   118/56  


 


 21 12:15   107 H  20   122/64  


 


 21 12:00  98.2 F  89  20   121/74  


 


 21 11:45   89  20  121/74   


 


 21 11:30   83  18  115/68   


 


 21 11:15   101 H  20  123/73   


 


 21 11:00   99 H  20  112/60   


 


 21 10:45   114 H  20  122/64   








                        Pain Assessment - Last Documented











Pain Intensity [Anterior]      7


 


Pain Intensity                 8


 


Pain Scale Used                0-10 Pain Scale











Intake and Output: 


                                 Intake & Output











 21





 11:59 11:59 11:59 11:59


 


Intake Total   5264 1000


 


Output Total   300 


 


Balance   4964 1000


 


Weight   77.111 kg 











Lab Results: 


                            Lab Results-Last 24 Hours











  21 Range/Units





  06:01 


 


WBC  14.1 H  (4.0-10.5)  K/mm3


 


RBC  2.87 L  (4.1-5.4)  M/mm3


 


Hgb  9.1 L  (12.0-16.0)  gm/dl


 


Hct  28.1 L  (35-47)  %


 


MCV  97.9  ()  fl


 


MCH  31.7  (26-32)  pg


 


MCHC  32.4  (32-36)  g/dl


 


RDW  12.8  (11.5-14.0)  %


 


Plt Count  250  (150-450)  K/mm3


 


MPV  9.1  (7.5-11.0)  fl


 


Gran %  71.0 H  (36.0-66.0)  %


 


Eos # (Auto)  0.14  (0-0.5)  


 


Absolute Lymphs (auto)  2.41  (1.0-4.6)  


 


Absolute Monos (auto)  1.53 H  (0.0-1.3)  


 


Lymphocytes %  17.1 L  (24.0-44.0)  %


 


Monocytes %  10.8  (0.0-12.0)  %


 


Eosinophils %  1.0  (0.00-5.0)  %


 


Basophils %  0.1  (0.0-0.4)  %


 


Absolute Granulocytes  10.03 H  (1.4-6.9)  


 


Basophils #  0.02  (0-0.4)  














Assessment/Plan


(1) Vaginal delivery


Current Visit: Yes   Status: Acute   Code(s): O80 - ENCOUNTER FOR FULL-TERM 

UNCOMPLICATED DELIVERY

## 2021-07-25 VITALS — OXYGEN SATURATION: 100 %

## 2021-07-25 VITALS — SYSTOLIC BLOOD PRESSURE: 109 MMHG | HEART RATE: 56 BPM | DIASTOLIC BLOOD PRESSURE: 75 MMHG

## 2021-07-25 RX ADMIN — Medication SCH MG: at 08:40

## 2021-07-25 RX ADMIN — ACETAMINOPHEN PRN MG: 500 TABLET ORAL at 08:25

## 2021-07-25 RX ADMIN — IBUPROFEN PRN MG: 400 TABLET ORAL at 02:35

## 2021-07-25 RX ADMIN — FAMOTIDINE SCH MG: 20 TABLET, FILM COATED ORAL at 08:40

## 2021-07-25 RX ADMIN — DOCUSATE SODIUM SCH MG: 100 CAPSULE, LIQUID FILLED ORAL at 08:40

## 2021-07-25 RX ADMIN — NITROFURANTOIN MONOHYDRATE/MACROCRYSTALLINE SCH MG: 25; 75 CAPSULE ORAL at 08:40

## 2021-07-25 RX ADMIN — Medication PRN PAD: at 13:57

## 2021-07-25 NOTE — PCM.NOTE
Date and Time: 21





Subjective Assessment: 





ppd 2


pt resting in bed and doing well able to ambulate and tolerate diet.


vss afebrile


abd; soft


uterus; firm


lochia; mild





a/p sp  ppd 2 with uti


  continue macrobid bid 5 days


  dc home today


  fu office 3 wks





OBJECTIVE DATA


Vital Signs: 


                               Vital Signs - 24 hr











  Temp Pulse Resp BP Pulse Ox


 


 21 03:00  98.7 F  61  18  114/59  100


 


 21 21:00  98.9 F  70  16  113/74  99


 


 21 14:00  98.2 F  74  18  105/58  97


 


 21 09:30   76  18  122/70 








                        Pain Assessment - Last Documented











Pain Intensity [Anterior]      8


 


Pain Intensity                 8


 


Pain Scale Used                0-10 Pain Scale











Intake and Output: 


                                 Intake & Output











 21





 11:59 11:59 11:59 11:59


 


Intake Total  5264 1000 2200


 


Output Total  300  


 


Balance  4964 1000 2200


 


Weight  77.111 kg  











Lab Results: 


                            Lab Results-Last 24 Hours











  21 Range/Units





  05:35 15:45 06:01 


 


Urine Color     (YELLOW)  


 


Urine Appearance     (CLEAR)  


 


Urine pH     (5-6)  


 


Ur Specific Gravity     (1.005-1.025)  


 


Urine Protein     (Negative)  


 


Urine Ketones     (NEGATIVE)  


 


Urine Blood     (0-5)  Roque/ul


 


Urine Nitrite     (NEGATIVE)  


 


Urine Bilirubin     (NEGATIVE)  


 


Urine Urobilinogen     (0-1)  mg/dL


 


Ur Leukocyte Esterase     (NEGATIVE)  


 


Urine WBC (Auto)     (0-5)  /HPF


 


Urine RBC (Auto)     (0-2)  /HPF


 


U Epithel Cells (Auto)     (FEW)  /HPF


 


Urine Bacteria (Auto)     (NEGATIVE)  /HPF


 


Urine Mucus (Auto)     (NEGATIVE)  /HPF


 


Urine Culture Reflexed     (NO)  


 


Urine Glucose     (NEGATIVE)  mg/dL


 


Hep Bs Antigen   Negative   (Negative)  


 


HIV 1&2 Ab/P24 Ag 4thGn  Non Reactive    (Non Reactive)  


 


Slides for Path Review    YES  














  21 Range/Units





  18:21 


 


Urine Color  YELLOW  (YELLOW)  


 


Urine Appearance  CLOUDY  (CLEAR)  


 


Urine pH  5.0  (5-6)  


 


Ur Specific Gravity  1.009  (1.005-1.025)  


 


Urine Protein  30  (Negative)  


 


Urine Ketones  NEGATIVE  (NEGATIVE)  


 


Urine Blood  LARGE  (0-5)  Roque/ul


 


Urine Nitrite  NEGATIVE  (NEGATIVE)  


 


Urine Bilirubin  NEGATIVE  (NEGATIVE)  


 


Urine Urobilinogen  NEGATIVE  (0-1)  mg/dL


 


Ur Leukocyte Esterase  LARGE  (NEGATIVE)  


 


Urine WBC (Auto)  >100  (0-5)  /HPF


 


Urine RBC (Auto)  >101  (0-2)  /HPF


 


U Epithel Cells (Auto)  RARE  (FEW)  /HPF


 


Urine Bacteria (Auto)  MODERATE  (NEGATIVE)  /HPF


 


Urine Mucus (Auto)  SLIGHT  (NEGATIVE)  /HPF


 


Urine Culture Reflexed  YES  (NO)  


 


Urine Glucose  NEGATIVE  (NEGATIVE)  mg/dL


 


Hep Bs Antigen   (Negative)  


 


HIV 1&2 Ab/P24 Ag 4thGn   (Non Reactive)  


 


Slides for Path Review   














Assessment/Plan


(1) Vaginal delivery


Current Visit: Yes   Status: Acute   Code(s): O80 - ENCOUNTER FOR FULL-TERM 

UNCOMPLICATED DELIVERY

## 2021-07-25 NOTE — PCM.DS
Discharge Summary


Date of Admission: 


21 08:36





Admitting Physician: 


GER HOWARD DO





Consults: 





                                Consults on Case





21 08:00


Notify  Anesthesia Provider PRN 





21 12:56


 Navigation ONCE 











Primary Care Provider: 


JOSE CELESTE








Allergies


Allergies





levofloxacin [From Levaquin] Allergy (Verified 21 14:31)


   


melatonin Allergy (Verified 21 14:31)


   


Penicillins Allergy (Verified 21 14:31)


   


sulfamethoxazole [From Bactrim] Allergy (Verified 21 14:31)


   


trimethoprim [From Bactrim] Allergy (Verified 21 14:31)


   


nickel Adverse Reaction (Intermediate, Verified 21 14:31)


   Skin Irritation











Hospital Summary





- Hospital Course


Hospital Course: 





pt admitted on  for cytotec induction with subsequent pitocin and 

delivered live baby girl without complication on .  during postpartum 

period did very well with stable hgb and now stable for discharge.  all quest

ions answered to her satisfaction.  understands the need to fu in office in 3 

wks for postpartum care and rx of macrobid to be sent to pharmacy for uti.





- Vitals & Intake/Output


Vital Signs: 





                                   Vital Signs











Temperature  98.7 F   21 03:00


 


Pulse Rate  61   21 03:00


 


Respiratory Rate  18   21 03:00


 


Blood Pressure  114/59   21 03:00


 


O2 Sat by Pulse Oximetry  100   21 03:00











Intake & Output: 





                                 Intake & Output











 21





 11:59 11:59 11:59 11:59


 


Intake Total  5264 1000 2200


 


Output Total  300  


 


Balance  4964 1000 2200


 


Weight  77.111 kg  














- Lab


Result Diagrams: 


                                 21 06:01





Lab Results-Last 24 Hrs: 





                            Lab Results-Last 24 Hours











  21 Range/Units





  05:35 15:45 06:01 


 


Urine Color     (YELLOW)  


 


Urine Appearance     (CLEAR)  


 


Urine pH     (5-6)  


 


Ur Specific Gravity     (1.005-1.025)  


 


Urine Protein     (Negative)  


 


Urine Ketones     (NEGATIVE)  


 


Urine Blood     (0-5)  Roque/ul


 


Urine Nitrite     (NEGATIVE)  


 


Urine Bilirubin     (NEGATIVE)  


 


Urine Urobilinogen     (0-1)  mg/dL


 


Ur Leukocyte Esterase     (NEGATIVE)  


 


Urine WBC (Auto)     (0-5)  /HPF


 


Urine RBC (Auto)     (0-2)  /HPF


 


U Epithel Cells (Auto)     (FEW)  /HPF


 


Urine Bacteria (Auto)     (NEGATIVE)  /HPF


 


Urine Mucus (Auto)     (NEGATIVE)  /HPF


 


Urine Culture Reflexed     (NO)  


 


Urine Glucose     (NEGATIVE)  mg/dL


 


Hep Bs Antigen   Negative   (Negative)  


 


HIV 1&2 Ab/P24 Ag 4thGn  Non Reactive    (Non Reactive)  


 


Slides for Path Review    YES  














  21 Range/Units





  18:21 


 


Urine Color  YELLOW  (YELLOW)  


 


Urine Appearance  CLOUDY  (CLEAR)  


 


Urine pH  5.0  (5-6)  


 


Ur Specific Gravity  1.009  (1.005-1.025)  


 


Urine Protein  30  (Negative)  


 


Urine Ketones  NEGATIVE  (NEGATIVE)  


 


Urine Blood  LARGE  (0-5)  Roque/ul


 


Urine Nitrite  NEGATIVE  (NEGATIVE)  


 


Urine Bilirubin  NEGATIVE  (NEGATIVE)  


 


Urine Urobilinogen  NEGATIVE  (0-1)  mg/dL


 


Ur Leukocyte Esterase  LARGE  (NEGATIVE)  


 


Urine WBC (Auto)  >100  (0-5)  /HPF


 


Urine RBC (Auto)  >101  (0-2)  /HPF


 


U Epithel Cells (Auto)  RARE  (FEW)  /HPF


 


Urine Bacteria (Auto)  MODERATE  (NEGATIVE)  /HPF


 


Urine Mucus (Auto)  SLIGHT  (NEGATIVE)  /HPF


 


Urine Culture Reflexed  YES  (NO)  


 


Urine Glucose  NEGATIVE  (NEGATIVE)  mg/dL


 


Hep Bs Antigen   (Negative)  


 


HIV 1&2 Ab/P24 Ag 4thGn   (Non Reactive)  


 


Slides for Path Review   











Micro Results-Entire Visit: 





                                  Microbiology











 21 18:21 Urine Culture - Preliminary





 Urine, Void    GRAM NEGATIVE ID AND SENSITIVITY PENDING














Final Diagnosis/Problem List





- Final Discharge Diagnosis/Problem


(1) Vaginal delivery


Current Visit: Yes   Status: Acute   Code(s): O80 - ENCOUNTER FOR FULL-TERM 

UNCOMPLICATED DELIVERY   





(2) Vaginal delivery


Current Visit: Yes   Status: Acute   Code(s): O80 - ENCOUNTER FOR FULL-TERM 

UNCOMPLICATED DELIVERY   





(3) Urinary tract infection


Current Visit: Yes   Status: Acute   Code(s): N39.0 - URINARY TRACT INFECTION, 

SITE NOT SPECIFIED   





- Discharge


Disposition: Home, Self-Care


Condition: Stable


Prescriptions: 


New


   Nitrofurantoin Macro 100 mg*** [Macrobid 100MG Capsule***] 100 mg PO BIDWM 5 

Days #10 capsule





Continue


   Prenatal Vits W-Ca,Fe,FA(<1Mg) [Prenatal] 1 ea PO DAILY


   Famotidine 20 mg*** [Pepcid 20 MG***] 20 mg PO BID


Instructions:  Labor Induction, Misoprostol


Follow up with: 


JOSE CELESTE NP [Primary Care Provider] - 


GER HOWARD DO [ACTIVE STAFF] - 3 weeks

## 2021-08-16 ENCOUNTER — HOSPITAL ENCOUNTER (EMERGENCY)
Dept: HOSPITAL 33 - ED | Age: 26
Discharge: HOME | End: 2021-08-16
Payer: COMMERCIAL

## 2021-08-16 VITALS — OXYGEN SATURATION: 98 % | SYSTOLIC BLOOD PRESSURE: 138 MMHG | DIASTOLIC BLOOD PRESSURE: 74 MMHG | HEART RATE: 80 BPM

## 2021-08-16 DIAGNOSIS — R42: Primary | ICD-10-CM

## 2021-08-16 LAB
ALBUMIN SERPL-MCNC: 3.6 G/DL (ref 3.5–5)
ALP SERPL-CCNC: 80 U/L (ref 38–126)
ALT SERPL-CCNC: 25 U/L (ref 0–35)
ANION GAP SERPL CALC-SCNC: 13 MEQ/L (ref 5–15)
AST SERPL QL: 29 U/L (ref 14–36)
BASOPHILS # BLD AUTO: 0.02 10*3/UL (ref 0–0.4)
BASOPHILS NFR BLD AUTO: 0.2 % (ref 0–0.4)
BILIRUB BLD-MCNC: 0.4 MG/DL (ref 0.2–1.3)
BUN SERPL-MCNC: 13 MG/DL (ref 7–17)
CALCIUM SPEC-MCNC: 9.2 MG/DL (ref 8.4–10.2)
CHLORIDE SERPL-SCNC: 105 MMOL/L (ref 98–107)
CO2 SERPL-SCNC: 24 MMOL/L (ref 22–30)
CREAT SERPL-MCNC: 0.81 MG/DL (ref 0.52–1.04)
EOSINOPHIL # BLD AUTO: 0.49 10*3/UL (ref 0–0.5)
GFR SERPLBLD BASED ON 1.73 SQ M-ARVRAT: > 60 ML/MIN
GLUCOSE SERPL-MCNC: 104 MG/DL (ref 74–106)
HCT VFR BLD AUTO: 40.5 % (ref 35–47)
HGB BLD-MCNC: 13.3 GM/DL (ref 12–16)
LYMPHOCYTES # SPEC AUTO: 2.31 10*3/UL (ref 1–4.6)
MCH RBC QN AUTO: 31.5 PG (ref 26–32)
MCHC RBC AUTO-ENTMCNC: 32.8 G/DL (ref 32–36)
MONOCYTES # BLD AUTO: 1.29 10*3/UL (ref 0–1.3)
PLATELET # BLD AUTO: 344 K/MM3 (ref 150–450)
POTASSIUM SERPLBLD-SCNC: 3.9 MMOL/L (ref 3.5–5.1)
PROT SERPL-MCNC: 6.9 G/DL (ref 6.3–8.2)
RBC # BLD AUTO: 4.22 M/MM3 (ref 4.1–5.4)
SODIUM SERPL-SCNC: 138 MMOL/L (ref 137–145)
WBC # BLD AUTO: 9.4 K/MM3 (ref 4–10.5)

## 2021-08-16 PROCEDURE — 94760 N-INVAS EAR/PLS OXIMETRY 1: CPT

## 2021-08-16 PROCEDURE — 80053 COMPREHEN METABOLIC PANEL: CPT

## 2021-08-16 PROCEDURE — 93041 RHYTHM ECG TRACING: CPT

## 2021-08-16 PROCEDURE — 84484 ASSAY OF TROPONIN QUANT: CPT

## 2021-08-16 PROCEDURE — 70450 CT HEAD/BRAIN W/O DYE: CPT

## 2021-08-16 PROCEDURE — 93005 ELECTROCARDIOGRAM TRACING: CPT

## 2021-08-16 PROCEDURE — 36415 COLL VENOUS BLD VENIPUNCTURE: CPT

## 2021-08-16 PROCEDURE — 85025 COMPLETE CBC W/AUTO DIFF WBC: CPT

## 2021-08-16 PROCEDURE — 36000 PLACE NEEDLE IN VEIN: CPT

## 2021-08-16 PROCEDURE — 99284 EMERGENCY DEPT VISIT MOD MDM: CPT

## 2021-08-16 PROCEDURE — 96360 HYDRATION IV INFUSION INIT: CPT

## 2021-08-16 NOTE — ERPHSYRPT
- History of Present Illness


Time Seen by Provider: 08/16/21 15:30


Source: patient


Patient Subjective Stated Complaint: pt here for being unsteady when she gets up

in the morning for last couple of days, she states it goes away in 2 hours, she 

is post partum 3 weeks ,


Triage Nursing Assessment: pt alert, walked in, resp easy, skin w.d.p no edema 

noted, face mask in place


Physician History: 


Patient is a 26-year-old female 3 weeks postpartum presents to our ED for 

evaluation of dizziness upon transitioning from sit to stand.  Patient is radha christian 3 weeks postpartum and has not been resting at all.  Patient states that her

dizziness lasts for several hours then resolves.  Patient is currently not 

dizzy.  No associated numbness tingling or weakness.  No nausea vomiting or 

diaphoresis.  No trauma no fever.  No headache.  No slurred speech.  Symptoms 

are mild in intensity when present.  Patient otherwise feels well.  She voices 

no other complaints or concerns at this time.





Timing/Duration: yesterday


Severity: mild


Modifying Factors: Improves With: nothing


Associated Symptoms: denies symptoms


Allergies/Adverse Reactions: 








levofloxacin [From Levaquin] Allergy (Verified 08/16/21 15:20)


   


melatonin Allergy (Verified 08/16/21 15:20)


   


Penicillins Allergy (Verified 08/16/21 15:20)


   


sulfamethoxazole [From Bactrim] Allergy (Verified 08/16/21 15:20)


   


trimethoprim [From Bactrim] Allergy (Verified 08/16/21 15:20)


   


nickel Adverse Reaction (Intermediate, Verified 08/16/21 15:20)


   Skin Irritation





Home Medications: 








Prenatal Vits W-Ca,Fe,FA(<1Mg) [Prenatal] 1 ea PO DAILY 02/08/21 [History]





Hx Tetanus, Diphtheria Vaccination/Date Given: No


Hx Influenza Vaccination/Date Given: Yes


Hx Pneumococcal Vaccination/Date Given: No


Immunizations Up to Date: Yes





Travel Risk





- International Travel


Have you traveled outside of the country in past 3 weeks: No





- Coronavirus Screening


Are you exhibiting any of the following symptoms?: No


Close contact with a COVID-19 positive Pt in past 14-21 Days: No





- Vaccine Status


Have you recieved a Covid-19 vaccination: No





- Review of Systems


Constitutional: No Symptoms, No Fever, No Chills


Eyes: No Symptoms


Ears, Nose, & Throat: No Symptoms


Respiratory: No Symptoms, No Cough, No Dyspnea


Cardiac: No Symptoms, No Chest Pain, No Edema, No Syncope


Abdominal/Gastrointestinal: No Symptoms, No Abdominal Pain, No Nausea, No 

Vomiting, No Diarrhea


Genitourinary Symptoms: No Symptoms, No Dysuria


Musculoskeletal: No Symptoms, No Back Pain, No Neck Pain


Skin: No Symptoms, No Rash


Neurological: No Symptoms, No Dizziness, No Focal Weakness, No Sensory Changes


Psychological: No Symptoms


Endocrine: No Symptoms


Hematologic/Lymphatic: No Symptoms


Immunological/Allergic: No Symptoms


All Other Systems: Reviewed and Negative





- Past Medical History


Pertinent Past Medical History: Yes


Neurological History: No Pertinent History


ENT History: No Pertinent History


Cardiac History: No Pertinent History


Respiratory History: No Pertinent History


Endocrine Medical History: No Pertinent History


Musculoskeletal History: Other


GI Medical History: No Pertinent History


 History: No Pertinent History


Psycho-Social History: No Pertinent History


Female Reproductive Disorders: No Pertinent History


Other Medical History: LEFT KNEE SX IN 2018,





- Past Surgical History


Past Surgical History: Yes


Neuro Surgical History: No Pertinent History


Cardiac: No Pertinent History


Respiratory: No Pertinent History


Gastrointestinal: No Pertinent History


Genitourinary: No Pertinent History


Musculoskeletal: No Pertinent History, Orthopedic Surgery


Female Surgical History: No Pertinent History


Other Surgical History: LEFT KNEE SX 2018, SINUS SX IN JUNE





- Social History


Smoking Status: Never smoker


Exposure to second hand smoke: No


Drug Use: none


Patient Lives Alone: No


Significant Family History: no pertinent family hx





- Female History


Hx Last Menstrual Period: oct 19 2020


Hx Pregnant Now: No





- Nursing Vital Signs


Nursing Vital Signs: 


                               Initial Vital Signs











Temperature  97.1 F   08/16/21 15:15


 


Pulse Rate  109 H  08/16/21 15:15


 


Respiratory Rate  18   08/16/21 15:15


 


Blood Pressure  138/88   08/16/21 15:15


 


O2 Sat by Pulse Oximetry  100   08/16/21 15:15








                                   Pain Scale











Pain Intensity                 5

















- Physical Exam


General Appearance: no apparent distress, alert


Eye Exam: PERRL/EOMI, eyes nml inspection


Ears, Nose, Throat Exam: normal ENT inspection, TMs normal, pharynx normal, 

moist mucous membranes


Neck Exam: normal inspection, non-tender, supple, full range of motion


Respiratory Exam: normal breath sounds, lungs clear, No respiratory distress


Cardiovascular Exam: regular rate/rhythm, normal heart sounds, normal peripheral

pulses


Gastrointestinal/Abdomen Exam: soft, normal bowel sounds, No tenderness, No mass


Back Exam: normal inspection, normal range of motion, No CVA tenderness, No 

vertebral tenderness


Extremity Exam: normal inspection, normal range of motion, pelvis stable


Neurologic Exam: alert, oriented x 3, cooperative, normal mood/affect, nml 

cerebellar function, nml station & gait, sensation nml, No motor deficits


Skin Exam: normal color, warm, dry, No rash


Lymphatic Exam: No adenopathy


**SpO2 Interpretation**: normal


SpO2: 97


O2 Delivery: Room Air





- Course


Nursing assessment & vital signs reviewed: Yes


EKG Interpreted by Me: RATE ([id number]), Sinus Rhythm, NORMAL AXIS, NORMAL INTERVALS





- CT Exams


  ** Head


CT Interpretation: Tele-radiologist Report (No acute intracranial bleed or other

acute brain processes seen.  Moderate mucosal thickening/soft tissue density 

within the posterior mid and posterior left aspect of the sphenoid sinus 

indicating at least chronic paranasal sinusitis disease.  I do not see a 

definite air-fluid level.)


Ordered Tests: 


                               Active Orders 24 hr











 Category Date Time Status


 


 Cardiac Monitor STAT Care  08/16/21 16:23 Active


 


 EKG-ER Only STAT Care  08/16/21 16:22 Active


 


 IV Insertion STAT Care  08/16/21 16:22 Active


 


 Pulse Oximetry (ED) STAT Care  08/16/21 16:22 Active


 


 HEAD WITHOUT CONTRAST [CT] Stat Exams  08/16/21 16:28 Completed


 


 CBC W DIFF Stat Lab  08/16/21 16:22 Completed


 


 CMP Stat Lab  08/16/21 15:30 Completed


 


 TROPONIN Q3H Lab  08/16/21 15:30 Completed


 


 TROPONIN Q3H Lab  08/16/21 19:11 Completed


 


 TROPONIN Q3H Lab  08/17/21 01:30 Ordered


 


 TROPONIN Q3H Lab  08/17/21 04:30 Ordered








Medication Summary














Discontinued Medications














Generic Name Dose Route Start Last Admin





  Trade Name Freq  PRN Reason Stop Dose Admin


 


Sodium Chloride  1,000 mls @ 999 mls/hr  08/16/21 16:22  08/16/21 17:50





  Sodium Chloride 0.9% 1000 Ml  IV  08/16/21 17:22  Infused





  .Q1H1M STA   Infusion


 


Sodium Chloride  Confirm  08/16/21 16:38 





  Sodium Chloride 0.9% 1000 Ml  Administered  08/16/21 16:39 





  Dose  





  1,000 mls @ ud  





  .ROUTE  





  .[facility]  











Lab/Rad Data: 


                           Laboratory Result Diagrams





                                 08/16/21 16:22 





                                 08/16/21 15:30 





                               Laboratory Results











  08/16/21 08/16/21 08/16/21 Range/Units





  19:11 16:22 15:30 


 


WBC   9.4   (4.0-10.5)  K/mm3


 


RBC   4.22   (4.1-5.4)  M/mm3


 


Hgb   13.3   (12.0-16.0)  gm/dl


 


Hct   40.5   (35-47)  %


 


MCV   96.0   ()  fl


 


MCH   31.5   (26-32)  pg


 


MCHC   32.8   (32-36)  g/dl


 


RDW   12.8   (11.5-14.0)  %


 


Plt Count   344   (150-450)  K/mm3


 


MPV   8.4   (7.5-11.0)  fl


 


Gran %   56.3   (36.0-66.0)  %


 


Eos # (Auto)   0.49   (0-0.5)  


 


Absolute Lymphs (auto)   2.31   (1.0-4.6)  


 


Absolute Monos (auto)   1.29   (0.0-1.3)  


 


Lymphocytes %   24.6   (24.0-44.0)  %


 


Monocytes %   13.7 H   (0.0-12.0)  %


 


Eosinophils %   5.2 H   (0.00-5.0)  %


 


Basophils %   0.2   (0.0-0.4)  %


 


Absolute Granulocytes   5.29   (1.4-6.9)  


 


Basophils #   0.02   (0-0.4)  


 


Sodium     (137-145)  mmol/L


 


Potassium     (3.5-5.1)  mmol/L


 


Chloride     ()  mmol/L


 


Carbon Dioxide     (22-30)  mmol/L


 


Anion Gap     (5-15)  MEQ/L


 


BUN     (7-17)  mg/dL


 


Creatinine     (0.52-1.04)  mg/dL


 


Estimated GFR     ML/MIN


 


Glucose     ()  mg/dL


 


Calcium     (8.4-10.2)  mg/dL


 


Total Bilirubin     (0.2-1.3)  mg/dL


 


AST     (14-36)  U/L


 


ALT     (0-35)  U/L


 


Alkaline Phosphatase     ()  U/L


 


Troponin I  < 0.012   < 0.012  (0.000-0.034)  ng/mL


 


Serum Total Protein     (6.3-8.2)  g/dL


 


Albumin     (3.5-5.0)  g/dL














  08/16/21 Range/Units





  15:30 


 


WBC   (4.0-10.5)  K/mm3


 


RBC   (4.1-5.4)  M/mm3


 


Hgb   (12.0-16.0)  gm/dl


 


Hct   (35-47)  %


 


MCV   ()  fl


 


MCH   (26-32)  pg


 


MCHC   (32-36)  g/dl


 


RDW   (11.5-14.0)  %


 


Plt Count   (150-450)  K/mm3


 


MPV   (7.5-11.0)  fl


 


Gran %   (36.0-66.0)  %


 


Eos # (Auto)   (0-0.5)  


 


Absolute Lymphs (auto)   (1.0-4.6)  


 


Absolute Monos (auto)   (0.0-1.3)  


 


Lymphocytes %   (24.0-44.0)  %


 


Monocytes %   (0.0-12.0)  %


 


Eosinophils %   (0.00-5.0)  %


 


Basophils %   (0.0-0.4)  %


 


Absolute Granulocytes   (1.4-6.9)  


 


Basophils #   (0-0.4)  


 


Sodium  138  (137-145)  mmol/L


 


Potassium  3.9  (3.5-5.1)  mmol/L


 


Chloride  105  ()  mmol/L


 


Carbon Dioxide  24  (22-30)  mmol/L


 


Anion Gap  13.0  (5-15)  MEQ/L


 


BUN  13  (7-17)  mg/dL


 


Creatinine  0.81  (0.52-1.04)  mg/dL


 


Estimated GFR  > 60.0  ML/MIN


 


Glucose  104  ()  mg/dL


 


Calcium  9.2  (8.4-10.2)  mg/dL


 


Total Bilirubin  0.40  (0.2-1.3)  mg/dL


 


AST  29  (14-36)  U/L


 


ALT  25  (0-35)  U/L


 


Alkaline Phosphatase  80  ()  U/L


 


Troponin I   (0.000-0.034)  ng/mL


 


Serum Total Protein  6.9  (6.3-8.2)  g/dL


 


Albumin  3.6  (3.5-5.0)  g/dL














- Progress


Progress: improved


Progress Note: 


Patient reassessed.  She feels well.  Vital stable.  Work-up essentially n

onremarkable.  Case discussed with patient's OB/GYN physician Dr. Mi who 

feels patient should be discharged home.  Patient symptoms may be due to lack of

 rest.  Patient's  states that he will help at home and assure that 

patient receives the rest she needs.  Will discharge at this time.  Troponin 

negative x2.  Patient states is ready for discharge she voices no other 

complaints concerns at this time.  She agrees to follow-up with her primary care

 doctor within 48 hours for reevaluation.


08/16/21 20:19





Discussed with : Bushra


Will see patient in: office


Counseled pt/family regarding: lab results, diagnosis, need for follow-up, rad 

results





- Departure


Departure Disposition: Home


Clinical Impression: 


 Dizziness





Condition: Stable


Critical Care Time: No


Referrals: 


JOSE CELESTE NP [Primary Care Provider] - 


Additional Instructions: 


Discharge/Care Plan





IZABELA GOETZ was seen on 08/16/21 in the Emergency Room. The patient was 

counseled regarding Diagnosis,Lab results, Imaging studies, need for follow up 

and when to return to the Emergency Room.





Prescriptions given:





Discharge Note





I have spoken with the patient and/or caregivers. I have explained the patient's

condition, diagnosis and treatment plan based on the information available to me

at this time. I have answered the patient's and/or caregiver's questions and 

addressed any concerns. The patient and/or caregivers have as good understanding

of the patient's diagnosis, condition and treatment plan as can be expected at 

this point. The vital signs have been stable. The patient's condition is stable 

and appropriate for discharge from the emergency department.





The patient will pursue further outpatient evaluation with the primary care 

physician or other designated or consulting physician as outlined in the 

discharge instructions. The patient and/or caregivers are agreeable to this plan

of care and follow-up instructions have been explained in detail. The patient 

and/or caregivers have received these instruction. The patient/and or caregivers

are aware that any significant change in condition or worsening of symptoms 

should prompt an immediate return to this or the closest emergency department or

call 911.

## 2021-08-16 NOTE — XRAY
Exam: CT of the head without IV contrast from 8/16/2021.

              CTDI: 53.92 mGy



Comparison: None.



Indication: Dizziness for 2 days in 26-year-old female.



Technique: Non-IV contrast axial images were obtained through the brain.

Reconstructed coronal and sagittal images were created and reviewed.



Findings: The ventricles appear of normal size and configuration.  No focal

mass effect or midline shift is seen.  No acute intracranial bleed or abnormal

extra-axial fluid collection is seen.  Gray matter-white matter interfaces

appear preserved.  No low attenuation infarct is seen.  The cortical sulci and

basilar cisterns appear unremarkable.



The calvarium of the skull appears intact without fracture.  The visualized

paranasal sinuses reveal moderate mucosal thickening within the posterior mid

and posterior left aspect of the sphenoid sinus.  No definite air-fluid level

is seen.  The remainder of the visualized paranasal sinuses appears clear.

There is slight deviation of the posterior aspect of the nasal septum toward

the left.



The mastoid air cells are clear without effusion.  The middle ear cavities

appear grossly unremarkable.  The orbits appear grossly unremarkable.



Impression:



1.  No acute intracranial bleed or other acute brain process is seen.



2.  Moderate mucosal thickening/soft tissue density within the posterior mid

and posterior left aspects of the sphenoid sinus indicating at least chronic

paranasal sinus disease/sinusitis.  I do not see a definite air-fluid level.

## 2022-12-06 ENCOUNTER — HOSPITAL ENCOUNTER (EMERGENCY)
Dept: HOSPITAL 33 - ED | Age: 27
Discharge: HOME | End: 2022-12-06
Payer: COMMERCIAL

## 2022-12-06 VITALS — HEART RATE: 100 BPM | OXYGEN SATURATION: 98 %

## 2022-12-06 VITALS — DIASTOLIC BLOOD PRESSURE: 60 MMHG | SYSTOLIC BLOOD PRESSURE: 130 MMHG

## 2022-12-06 DIAGNOSIS — M54.50: Primary | ICD-10-CM

## 2022-12-06 DIAGNOSIS — Z79.52: ICD-10-CM

## 2022-12-06 DIAGNOSIS — Z28.310: ICD-10-CM

## 2022-12-06 PROCEDURE — 99282 EMERGENCY DEPT VISIT SF MDM: CPT

## 2022-12-06 NOTE — ERPHSYRPT
- History of Present Illness


Time Seen by Provider: 12/06/22 15:41


Source: patient, family


Exam Limitations: no limitations


Physician History: 





This is a 27-year-old white female patient who denies fall or traumatic injury 

to her back.  However, she was doing a lot of lifting bending and twisting 

yesterday afternoon and evening.  Her pain is midline lower back which shoots to

the left buttock area is sharp.  She does not have any urinary symptoms.  She 

has no abdominal pain.  She has no chest pain.  She has no shortness of breath


Timing/Duration: yesterday


Method of Injury: bending, lifting, twisted


Quality: sharp, stabbing


Back Pain Location: lumbar spine, paraspinous muscles


Back Pain Radiation: buttocks (Left side)


Severity of Pain-Max: moderate


Severity of Pain-Current: mild (Mild to moderate)


Associated Symptoms: lower back pain, No urinary incontinence, No loss of bowel 

control, No problems urinating, No numbness in legs/feet, No sensory/motor loss,

No tingling in legs/feet


Previous symptoms: no prior history


Allergies/Adverse Reactions: 








levofloxacin [From Levaquin] Allergy (Verified 12/06/22 15:37)


   


melatonin Allergy (Verified 12/06/22 15:37)


   


Penicillins Allergy (Verified 12/06/22 15:37)


   


sulfamethoxazole [From Bactrim] Allergy (Verified 12/06/22 15:37)


   


trimethoprim [From Bactrim] Allergy (Verified 12/06/22 15:37)


   


nickel Adverse Reaction (Intermediate, Verified 12/06/22 15:37)


   Skin Irritation





Home Medications: 








Prenatal Vits W-Ca,Fe,FA(<1Mg) [Prenatal] 1 ea PO DAILY 02/08/21 [History]





Hx Tetanus, Diphtheria Vaccination/Date Given: No


Hx Influenza Vaccination/Date Given: Yes


Hx Pneumococcal Vaccination/Date Given: No





Travel Risk





- International Travel


Have you traveled outside of the country in past 3 weeks: No





- Coronavirus Screening


Are you exhibiting any of the following symptoms?: No


Close contact with a COVID-19 positive Pt in past 14-21 Days: No





- Vaccine Status


Have you recieved a Covid-19 vaccination: No





- Review of Systems


Constitutional: No Symptoms


Eyes: No Symptoms


Ears, Nose, & Throat: No Symptoms


Respiratory: No Symptoms


Cardiac: No Symptoms


Abdominal/Gastrointestinal: No Symptoms


Genitourinary Symptoms: No Symptoms


Musculoskeletal: Back Pain (Lower back), No Injury


Skin: No Symptoms


Neurological: No Symptoms


Psychological: No Symptoms


Endocrine: No Symptoms


Hematologic/Lymphatic: No Symptoms


Immunological/Allergic: No Symptoms


All Other Systems: Reviewed and Negative





- Past Medical History


Pertinent Past Medical History: Yes


Neurological History: No Pertinent History


ENT History: No Pertinent History


Cardiac History: No Pertinent History


Respiratory History: No Pertinent History


Endocrine Medical History: No Pertinent History


Musculoskeletal History: Other


GI Medical History: No Pertinent History


 History: No Pertinent History


Psycho-Social History: No Pertinent History


Female Reproductive Disorders: No Pertinent History


Other Medical History: LEFT KNEE SX IN 2018,





- Past Surgical History


Past Surgical History: Yes


Neuro Surgical History: No Pertinent History


Cardiac: No Pertinent History


Respiratory: No Pertinent History


Gastrointestinal: No Pertinent History


Genitourinary: No Pertinent History


Musculoskeletal: No Pertinent History, Orthopedic Surgery


Female Surgical History: No Pertinent History


Other Surgical History: LEFT KNEE SX 2018, SINUS SX IN JUNE





- Social History


Smoking Status: Never smoker


Exposure to second hand smoke: No


Drug Use: none


Patient Lives Alone: No


Significant Family History: no pertinent family hx





- Nursing Vital Signs


Nursing Vital Signs: 


                               Initial Vital Signs











Temperature  98.5 F   12/06/22 15:38


 


Pulse Rate  82   12/06/22 15:38


 


Respiratory Rate  20   12/06/22 15:38


 


Blood Pressure  130/60   12/06/22 15:38


 


O2 Sat by Pulse Oximetry  99   12/06/22 15:38








                                   Pain Scale











Pain Intensity [Left Lower     10





Back]                          


 


Pain Intensity                 10

















- Physical Exam


General Appearance: no apparent distress, alert, anxiety


Eye Exam: PERRL/EOMI, eyes nml inspection


Ears, Nose, Throat Exam: normal ENT inspection, moist mucous membranes


Neck Exam: normal inspection, non-tender, supple, full range of motion


Respiratory Exam: airway intact, No chest tenderness, No respiratory distress


Gastrointestinal Exam: No tenderness


Pelvic Exam: not done


Rectal Exam: not done


Back Exam: normal inspection, normal range of motion, muscle spasm (Left lower 

lumbar paraspinous muscle region), No CVA tenderness


Extremity Exam: normal inspection, normal range of motion, pelvis stable


Neurologic Exam: alert, oriented x 3, cooperative, CNs II-XII nml as tested, 

normal mood/affect, nml cerebellar function, nml station & gait, sensation nml


Skin Exam: normal color, warm, dry


Lymphatic Exam: No adenopathy


**SpO2 Interpretation**: normal


O2 Delivery: Room Air





- Course


Nursing assessment & vital signs reviewed: Yes


Ordered Tests: 


Medication Summary














Discontinued Medications














Generic Name Dose Route Start Last Admin





  Trade Name Terri  PRN Reason Stop Dose Admin


 


Orphenadrine Citrate  100 mg  12/06/22 16:52 





  Orphenadrine Citrate 100 Mg Er Tab  PO  12/06/22 16:53 





  STAT ONE  


 


Oxycodone/Acetaminophen  1 tab  12/06/22 16:51 





  Oxycodone Hcl/Apap 5 Mg/325 Mg Tablet  PO  12/06/22 16:52 





  STAT STA  


 


Prednisone  20 mg  12/06/22 16:52 





  Prednisone 20 Mg Tablet  PO  12/06/22 16:53 





  STAT ONE  














- Progress


Progress: improved, pain not gone completely


Counseled pt/family regarding: diagnosis, need for follow-up





- Departure


Departure Disposition: Home


Clinical Impression: 


 Back pain





Condition: Stable


Critical Care Time: No


Referrals: 


PADMINI MCGEE MD [ACTIVE STAFF] - Follow up/PCP as directed


Additional Instructions: 


Take your medication as prescribed.  No running jumping or lifting for 48 hours.

 May be up and ambulating and walking.  Follow-up with your primary care 

provider for persistent symptoms.  


Prescriptions: 


Prednisone 10 mg*** [Deltasone 10 mg***] 10 mg PO TID #12 tablet


Orphenadrine Citrate 100 mg*** [Norflex 100 MG Tablet***] 100 mg PO BID #10 tab